# Patient Record
Sex: MALE | Race: OTHER
[De-identification: names, ages, dates, MRNs, and addresses within clinical notes are randomized per-mention and may not be internally consistent; named-entity substitution may affect disease eponyms.]

---

## 2019-10-01 ENCOUNTER — HOSPITAL ENCOUNTER (INPATIENT)
Dept: HOSPITAL 54 - ER | Age: 64
LOS: 6 days | Discharge: NURSING, REHAB OR LONG-TERM CARE | DRG: 197 | End: 2019-10-07
Attending: NURSE PRACTITIONER | Admitting: NURSE PRACTITIONER
Payer: COMMERCIAL

## 2019-10-01 VITALS — WEIGHT: 260 LBS | BODY MASS INDEX: 35.21 KG/M2 | HEIGHT: 72 IN

## 2019-10-01 VITALS — SYSTOLIC BLOOD PRESSURE: 101 MMHG | DIASTOLIC BLOOD PRESSURE: 56 MMHG

## 2019-10-01 VITALS — SYSTOLIC BLOOD PRESSURE: 144 MMHG | DIASTOLIC BLOOD PRESSURE: 84 MMHG

## 2019-10-01 VITALS — DIASTOLIC BLOOD PRESSURE: 84 MMHG | SYSTOLIC BLOOD PRESSURE: 144 MMHG

## 2019-10-01 DIAGNOSIS — Z79.84: ICD-10-CM

## 2019-10-01 DIAGNOSIS — Z79.899: ICD-10-CM

## 2019-10-01 DIAGNOSIS — Z86.718: ICD-10-CM

## 2019-10-01 DIAGNOSIS — Z79.01: ICD-10-CM

## 2019-10-01 DIAGNOSIS — E87.2: ICD-10-CM

## 2019-10-01 DIAGNOSIS — L03.116: ICD-10-CM

## 2019-10-01 DIAGNOSIS — Z79.4: ICD-10-CM

## 2019-10-01 DIAGNOSIS — I82.412: Primary | ICD-10-CM

## 2019-10-01 DIAGNOSIS — M17.10: ICD-10-CM

## 2019-10-01 DIAGNOSIS — I82.432: ICD-10-CM

## 2019-10-01 DIAGNOSIS — M16.0: ICD-10-CM

## 2019-10-01 DIAGNOSIS — E11.65: ICD-10-CM

## 2019-10-01 DIAGNOSIS — L02.416: ICD-10-CM

## 2019-10-01 DIAGNOSIS — D69.6: ICD-10-CM

## 2019-10-01 DIAGNOSIS — I10: ICD-10-CM

## 2019-10-01 DIAGNOSIS — E66.9: ICD-10-CM

## 2019-10-01 LAB
ALBUMIN SERPL BCP-MCNC: 3.7 G/DL (ref 3.4–5)
ALP SERPL-CCNC: 84 U/L (ref 46–116)
ALT SERPL W P-5'-P-CCNC: 26 U/L (ref 12–78)
AST SERPL W P-5'-P-CCNC: 8 U/L (ref 15–37)
BASOPHILS # BLD AUTO: 0.1 /CMM (ref 0–0.2)
BASOPHILS NFR BLD AUTO: 0.8 % (ref 0–2)
BILIRUB DIRECT SERPL-MCNC: 0.1 MG/DL (ref 0–0.2)
BILIRUB SERPL-MCNC: 0.7 MG/DL (ref 0.2–1)
BUN SERPL-MCNC: 12 MG/DL (ref 7–18)
CALCIUM SERPL-MCNC: 8.8 MG/DL (ref 8.5–10.1)
CHLORIDE SERPL-SCNC: 103 MMOL/L (ref 98–107)
CO2 SERPL-SCNC: 26 MMOL/L (ref 21–32)
CREAT SERPL-MCNC: 1.2 MG/DL (ref 0.6–1.3)
EOSINOPHIL NFR BLD AUTO: 3 % (ref 0–6)
GLUCOSE SERPL-MCNC: 199 MG/DL (ref 74–106)
HCT VFR BLD AUTO: 42 % (ref 39–51)
HGB BLD-MCNC: 14.2 G/DL (ref 13.5–17.5)
LYMPHOCYTES NFR BLD AUTO: 1.9 /CMM (ref 0.8–4.8)
LYMPHOCYTES NFR BLD AUTO: 28.4 % (ref 20–44)
MCHC RBC AUTO-ENTMCNC: 34 G/DL (ref 31–36)
MCV RBC AUTO: 91 FL (ref 80–96)
MONOCYTES NFR BLD AUTO: 0.5 /CMM (ref 0.1–1.3)
MONOCYTES NFR BLD AUTO: 7.7 % (ref 2–12)
NEUTROPHILS # BLD AUTO: 4 /CMM (ref 1.8–8.9)
NEUTROPHILS NFR BLD AUTO: 60.1 % (ref 43–81)
PLATELET # BLD AUTO: 149 /CMM (ref 150–450)
POTASSIUM SERPL-SCNC: 3.9 MMOL/L (ref 3.5–5.1)
PROT SERPL-MCNC: 8.1 G/DL (ref 6.4–8.2)
RBC # BLD AUTO: 4.58 MIL/UL (ref 4.5–6)
SODIUM SERPL-SCNC: 140 MMOL/L (ref 136–145)
WBC NRBC COR # BLD AUTO: 6.7 K/UL (ref 4.3–11)

## 2019-10-01 PROCEDURE — G0378 HOSPITAL OBSERVATION PER HR: HCPCS

## 2019-10-01 RX ADMIN — INSULIN GLARGINE SCH UNIT: 100 INJECTION, SOLUTION SUBCUTANEOUS at 22:23

## 2019-10-01 RX ADMIN — SODIUM CHLORIDE PRN MLS/HR: 9 INJECTION, SOLUTION INTRAVENOUS at 21:00

## 2019-10-01 RX ADMIN — PIPERACILLIN SODIUM AND TAZOBACTAM SODIUM SCH MLS/HR: .375; 3 INJECTION, POWDER, LYOPHILIZED, FOR SOLUTION INTRAVENOUS at 20:52

## 2019-10-01 RX ADMIN — Medication SCH EACH: at 22:17

## 2019-10-01 RX ADMIN — INSULIN HUMAN PRN UNIT: 100 INJECTION, SOLUTION PARENTERAL at 22:25

## 2019-10-01 RX ADMIN — Medication SCH MG: at 22:17

## 2019-10-01 NOTE — NUR
PT [facility] from [facility] "LLE swelling r/o 
abscess". Pt AAOX4, BREATHING EVENLY ON ROOM AIR, NAD. WILL CONTINUE TO 
MONITOR.

## 2019-10-01 NOTE — NUR
ADMITTED PT FROM ER WITH DX LLE CELLULITIS AND LLE DVT.PT IS ALERT AND ORIENTED X3.VERBALLY 
RESPONSIVE IN Persian AND LITTLE ENGLISH.AMBULATES WITH FWW WITH ASSIST.WITH NON PITTING 
EDEMA 2+ ON LLE. DENIES ANY PAIN OR DISTRESS AT THIS TIME.SKIN INTACT.NO SOB ON ROOM 
AIR.AWAITING FOR ADMITTING ORDERS.,DNP AWARE.CALL LIGHT PLACED WITHIN REACH.

## 2019-10-01 NOTE — NUR
MS/RN OPENING NOTES

RECEIVED PATIENT IN BED, ALERT, ORIENTED X3, ABLE TO VERBALIZE NEEDS, WITH LOWER EXTREMITY 
ON LEFT INFLAMED. WITH DVT , ON IV ANTIBIOTIC THERAPY ZOSYN TO BE ADMINISTERED AT 2100. 
PATIENT WAS RECEIVED BY AM RN IN AFTERNOON AND REPORTED AND VERBALIZED BELONGINGS , REFUSE 
TO HAVE CASH BE PUT IN SAFE FOR SAFETY, AND REFUSE BACK PACK BE INVENTORIED. PATIENT WITH 
LEFT AC IV PLACED AND REFUSE TO HAVE ANOTHER ONE BE INSERTED AT THIS TIME. WILL MONITOR, 
USES WALKER AND REQUIRE  BIGGER ONE FOR HIS SIZE AND HEIGHT. BED LOCKED, CALL LIGHTS WITHIN 
REACH. RECEIVED ENDORSEMENT FROM AM RN. PHOTO ON LEFT LEG DONE, WILL MONITOR.

## 2019-10-01 NOTE — NUR
MS/RN NOTES

BLOOD SUGAR CHECK  PATIENT ALERT, ORIENTED X3,, ABLE TO VERBALIZE NEEDS, ADMINISTERED 
SQ TO SITE OF SELECTION, PROVIDED SNACKS. WILL MONITOR.

## 2019-10-02 VITALS — DIASTOLIC BLOOD PRESSURE: 93 MMHG | SYSTOLIC BLOOD PRESSURE: 139 MMHG

## 2019-10-02 VITALS — SYSTOLIC BLOOD PRESSURE: 147 MMHG | DIASTOLIC BLOOD PRESSURE: 84 MMHG

## 2019-10-02 VITALS — SYSTOLIC BLOOD PRESSURE: 124 MMHG | DIASTOLIC BLOOD PRESSURE: 82 MMHG

## 2019-10-02 LAB
ALBUMIN SERPL BCP-MCNC: 3.1 G/DL (ref 3.4–5)
ALP SERPL-CCNC: 66 U/L (ref 46–116)
ALT SERPL W P-5'-P-CCNC: 25 U/L (ref 12–78)
AST SERPL W P-5'-P-CCNC: 11 U/L (ref 15–37)
BASOPHILS # BLD AUTO: 0 /CMM (ref 0–0.2)
BASOPHILS NFR BLD AUTO: 0.6 % (ref 0–2)
BILIRUB SERPL-MCNC: 0.7 MG/DL (ref 0.2–1)
BUN SERPL-MCNC: 14 MG/DL (ref 7–18)
CALCIUM SERPL-MCNC: 8.5 MG/DL (ref 8.5–10.1)
CHLORIDE SERPL-SCNC: 107 MMOL/L (ref 98–107)
CHOLEST SERPL-MCNC: 194 MG/DL (ref ?–200)
CO2 SERPL-SCNC: 25 MMOL/L (ref 21–32)
CREAT SERPL-MCNC: 1.1 MG/DL (ref 0.6–1.3)
EOSINOPHIL NFR BLD AUTO: 4.3 % (ref 0–6)
GLUCOSE SERPL-MCNC: 162 MG/DL (ref 74–106)
HCT VFR BLD AUTO: 36 % (ref 39–51)
HDLC SERPL-MCNC: 25 MG/DL (ref 40–60)
HGB BLD-MCNC: 12.8 G/DL (ref 13.5–17.5)
LDLC SERPL DIRECT ASSAY-MCNC: 78 MG/DL (ref 0–99)
LYMPHOCYTES NFR BLD AUTO: 1.9 /CMM (ref 0.8–4.8)
LYMPHOCYTES NFR BLD AUTO: 29.3 % (ref 20–44)
MAGNESIUM SERPL-MCNC: 2.2 MG/DL (ref 1.8–2.4)
MCHC RBC AUTO-ENTMCNC: 35 G/DL (ref 31–36)
MCV RBC AUTO: 90 FL (ref 80–96)
MONOCYTES NFR BLD AUTO: 0.6 /CMM (ref 0.1–1.3)
MONOCYTES NFR BLD AUTO: 10.1 % (ref 2–12)
NEUTROPHILS # BLD AUTO: 3.5 /CMM (ref 1.8–8.9)
NEUTROPHILS NFR BLD AUTO: 55.7 % (ref 43–81)
PHOSPHATE SERPL-MCNC: 4.4 MG/DL (ref 2.5–4.9)
PLATELET # BLD AUTO: 132 /CMM (ref 150–450)
POTASSIUM SERPL-SCNC: 3.8 MMOL/L (ref 3.5–5.1)
PROT SERPL-MCNC: 6.7 G/DL (ref 6.4–8.2)
RBC # BLD AUTO: 4.03 MIL/UL (ref 4.5–6)
SODIUM SERPL-SCNC: 141 MMOL/L (ref 136–145)
TRIGL SERPL-MCNC: 489 MG/DL (ref 30–150)
TSH SERPL DL<=0.005 MIU/L-ACNC: 2.15 UIU/ML (ref 0.36–3.74)
WBC NRBC COR # BLD AUTO: 6.4 K/UL (ref 4.3–11)

## 2019-10-02 RX ADMIN — PIPERACILLIN SODIUM AND TAZOBACTAM SODIUM SCH MLS/HR: .375; 3 INJECTION, POWDER, LYOPHILIZED, FOR SOLUTION INTRAVENOUS at 08:49

## 2019-10-02 RX ADMIN — INSULIN GLARGINE SCH UNIT: 100 INJECTION, SOLUTION SUBCUTANEOUS at 22:09

## 2019-10-02 RX ADMIN — Medication SCH EACH: at 12:12

## 2019-10-02 RX ADMIN — PIPERACILLIN SODIUM AND TAZOBACTAM SODIUM SCH MLS/HR: .375; 3 INJECTION, POWDER, LYOPHILIZED, FOR SOLUTION INTRAVENOUS at 03:52

## 2019-10-02 RX ADMIN — Medication SCH MG: at 22:07

## 2019-10-02 RX ADMIN — SODIUM CHLORIDE PRN MLS/HR: 9 INJECTION, SOLUTION INTRAVENOUS at 15:16

## 2019-10-02 RX ADMIN — Medication SCH EACH: at 18:16

## 2019-10-02 RX ADMIN — PIPERACILLIN SODIUM AND TAZOBACTAM SODIUM SCH MLS/HR: .375; 3 INJECTION, POWDER, LYOPHILIZED, FOR SOLUTION INTRAVENOUS at 21:18

## 2019-10-02 RX ADMIN — RIVAROXABAN SCH MG: 10 TABLET, FILM COATED ORAL at 12:28

## 2019-10-02 RX ADMIN — Medication SCH EACH: at 22:17

## 2019-10-02 RX ADMIN — DEXTROSE MONOHYDRATE SCH MLS/HR: 50 INJECTION, SOLUTION INTRAVENOUS at 01:39

## 2019-10-02 RX ADMIN — DEXTROSE MONOHYDRATE SCH MLS/HR: 50 INJECTION, SOLUTION INTRAVENOUS at 15:16

## 2019-10-02 RX ADMIN — INSULIN HUMAN PRN UNIT: 100 INJECTION, SOLUTION PARENTERAL at 12:24

## 2019-10-02 RX ADMIN — Medication PRN EACH: at 22:50

## 2019-10-02 RX ADMIN — INSULIN HUMAN PRN UNIT: 100 INJECTION, SOLUTION PARENTERAL at 18:19

## 2019-10-02 RX ADMIN — INSULIN HUMAN PRN UNIT: 100 INJECTION, SOLUTION PARENTERAL at 22:08

## 2019-10-02 RX ADMIN — Medication SCH EACH: at 06:02

## 2019-10-02 RX ADMIN — PIPERACILLIN SODIUM AND TAZOBACTAM SODIUM SCH MLS/HR: .375; 3 INJECTION, POWDER, LYOPHILIZED, FOR SOLUTION INTRAVENOUS at 15:42

## 2019-10-02 RX ADMIN — Medication SCH EACH: at 16:33

## 2019-10-02 NOTE — NUR
MS RN OPENING NOTES



RECEIVED PATIENT IN BED ASLEEP, AROUSABLE TO VERBAL AND TACTILE STIMULI. HOB ELEVATED. NO 
SOB .DENIES ANY C/O PAIN NOR DISCOMFORT AT THIS TIME. LAC # 18 INTACT AND PATENT INFUSING 75 
ML/HR STACY WELL. BED IN LOWEST POSITION, LOCKED. CALL LIGHT WITHIN REACH.

## 2019-10-02 NOTE — NUR
MS RN CLOSING  NOTES



ALERT AND ORIENTED X4. HOB ELEVATED. NO SOB . RESTING COMFORTABLY IN BED. DENIES ANY C/O 
PAIN NOR DISCOMFORT AT THIS TIME. LAC # 18 INTACT AND PATENT INFUSING NS @ 75 ML/HR STACY 
WELL. BED IN LOWEST POSITION, LOCKED. CALL LIGHT WITHIN REACH.  ABLE TO VERBALIZE NEEDS. IN 
NO APPARENT DISTRESS.

## 2019-10-02 NOTE — NUR
MS/RN NOTES

PATIENT RESTING IN BED, ASLEEP, RESPIRATIONS EVEN AND UNLABORED, AROUSES EASILY. SKIN WARM 
TO TOUCH, ABLE TO USE URINAL AND VERBALIZE NEEDS, REFUSE PAIN MEDICATION, LEFT LEG SWOLLEN . 
BED LOCKED, CALL LIGHTS WITHIN REACH. WILL MONITOR. BLOOD SUGAR CHECK AND PREFER TO HAVE 
INSULIN RIGHT BEFORE BREAKFAST REPORTED. WILL ENDORSE TO AM RN FOR PASTOR.

## 2019-10-02 NOTE — NUR
MS/RN

ON INITIAL ROUNDING AT 1930, PATIENT WAS IN BED AWAKE, ALERT, ORIENTED, COMFORTABLE, NO C/O 
PAIN, NO DISTRESS NOTED, CALL LIGHT IN REACH. FALL PRECAUTION, WILL MONITOR.

## 2019-10-03 VITALS — DIASTOLIC BLOOD PRESSURE: 96 MMHG | SYSTOLIC BLOOD PRESSURE: 132 MMHG

## 2019-10-03 VITALS — DIASTOLIC BLOOD PRESSURE: 84 MMHG | SYSTOLIC BLOOD PRESSURE: 135 MMHG

## 2019-10-03 VITALS — SYSTOLIC BLOOD PRESSURE: 131 MMHG | DIASTOLIC BLOOD PRESSURE: 79 MMHG

## 2019-10-03 LAB
BASOPHILS # BLD AUTO: 0 /CMM (ref 0–0.2)
BASOPHILS NFR BLD AUTO: 0.4 % (ref 0–2)
BUN SERPL-MCNC: 9 MG/DL (ref 7–18)
CALCIUM SERPL-MCNC: 8.5 MG/DL (ref 8.5–10.1)
CHLORIDE SERPL-SCNC: 104 MMOL/L (ref 98–107)
CO2 SERPL-SCNC: 28 MMOL/L (ref 21–32)
CREAT SERPL-MCNC: 1.1 MG/DL (ref 0.6–1.3)
EOSINOPHIL NFR BLD AUTO: 3.4 % (ref 0–6)
GLUCOSE SERPL-MCNC: 160 MG/DL (ref 74–106)
HCT VFR BLD AUTO: 38 % (ref 39–51)
HGB BLD-MCNC: 13 G/DL (ref 13.5–17.5)
LYMPHOCYTES NFR BLD AUTO: 2.1 /CMM (ref 0.8–4.8)
LYMPHOCYTES NFR BLD AUTO: 23.7 % (ref 20–44)
MCHC RBC AUTO-ENTMCNC: 34 G/DL (ref 31–36)
MCV RBC AUTO: 91 FL (ref 80–96)
MONOCYTES NFR BLD AUTO: 0.9 /CMM (ref 0.1–1.3)
MONOCYTES NFR BLD AUTO: 9.6 % (ref 2–12)
NEUTROPHILS # BLD AUTO: 5.7 /CMM (ref 1.8–8.9)
NEUTROPHILS NFR BLD AUTO: 62.9 % (ref 43–81)
PLATELET # BLD AUTO: 138 /CMM (ref 150–450)
POTASSIUM SERPL-SCNC: 3.5 MMOL/L (ref 3.5–5.1)
RBC # BLD AUTO: 4.17 MIL/UL (ref 4.5–6)
SODIUM SERPL-SCNC: 140 MMOL/L (ref 136–145)
WBC NRBC COR # BLD AUTO: 9.1 K/UL (ref 4.3–11)

## 2019-10-03 RX ADMIN — DEXTROSE MONOHYDRATE SCH MLS/HR: 50 INJECTION, SOLUTION INTRAVENOUS at 02:08

## 2019-10-03 RX ADMIN — INSULIN HUMAN PRN UNIT: 100 INJECTION, SOLUTION PARENTERAL at 06:40

## 2019-10-03 RX ADMIN — Medication SCH EACH: at 17:01

## 2019-10-03 RX ADMIN — INSULIN HUMAN PRN UNIT: 100 INJECTION, SOLUTION PARENTERAL at 12:25

## 2019-10-03 RX ADMIN — Medication PRN EACH: at 20:21

## 2019-10-03 RX ADMIN — Medication SCH EACH: at 17:17

## 2019-10-03 RX ADMIN — Medication SCH MG: at 21:22

## 2019-10-03 RX ADMIN — RIVAROXABAN SCH MG: 10 TABLET, FILM COATED ORAL at 12:18

## 2019-10-03 RX ADMIN — PIPERACILLIN SODIUM AND TAZOBACTAM SODIUM SCH MLS/HR: .375; 3 INJECTION, POWDER, LYOPHILIZED, FOR SOLUTION INTRAVENOUS at 20:17

## 2019-10-03 RX ADMIN — PIPERACILLIN SODIUM AND TAZOBACTAM SODIUM SCH MLS/HR: .375; 3 INJECTION, POWDER, LYOPHILIZED, FOR SOLUTION INTRAVENOUS at 08:53

## 2019-10-03 RX ADMIN — Medication SCH EACH: at 21:22

## 2019-10-03 RX ADMIN — Medication PRN EACH: at 14:21

## 2019-10-03 RX ADMIN — PIPERACILLIN SODIUM AND TAZOBACTAM SODIUM SCH MLS/HR: .375; 3 INJECTION, POWDER, LYOPHILIZED, FOR SOLUTION INTRAVENOUS at 04:21

## 2019-10-03 RX ADMIN — Medication SCH EACH: at 12:18

## 2019-10-03 RX ADMIN — SODIUM CHLORIDE PRN MLS/HR: 9 INJECTION, SOLUTION INTRAVENOUS at 12:00

## 2019-10-03 RX ADMIN — DEXTROSE MONOHYDRATE SCH MLS/HR: 50 INJECTION, SOLUTION INTRAVENOUS at 15:20

## 2019-10-03 RX ADMIN — PIPERACILLIN SODIUM AND TAZOBACTAM SODIUM SCH MLS/HR: .375; 3 INJECTION, POWDER, LYOPHILIZED, FOR SOLUTION INTRAVENOUS at 15:03

## 2019-10-03 RX ADMIN — Medication SCH EACH: at 06:49

## 2019-10-03 RX ADMIN — Medication SCH EACH: at 08:38

## 2019-10-03 RX ADMIN — INSULIN HUMAN PRN UNIT: 100 INJECTION, SOLUTION PARENTERAL at 17:01

## 2019-10-03 RX ADMIN — INSULIN GLARGINE SCH UNIT: 100 INJECTION, SOLUTION SUBCUTANEOUS at 21:23

## 2019-10-03 RX ADMIN — INSULIN HUMAN PRN UNIT: 100 INJECTION, SOLUTION PARENTERAL at 21:24

## 2019-10-03 NOTE — NUR
MS RN CLOSING NOTES



PATIENT RESTING COMNFORTABLY IN BED. NO SOB. DENIES ANY C/O PAIN NOR DISCOMFORT AT THIS 
TIME. RH # 22 SL INTACT AND PATENT, LT HAND # 12 INTACT AND PATENT INFUSING NS @ 75 ML/HR 
STACY WELL. AMBULATORY WITH UNSTEADY GAIT WITH FWW. IN NO APPARENT DISTRESS. BED IN LOWEST 
POSITION, LOCKED. BED SIDERAILS UP X2. CALL LIGHT WITHIN REACH.

## 2019-10-03 NOTE — NUR
MS/RN

PATIENT IS AWAKE AT THIS TIME, COMFORTABLE, NO DISTRESS NOTED, CALL LIGHT IN REACH. ALL 
NEEDS ATTENDED AT THIS TIME, WILL CONTINUE TO MONITOR.

## 2019-10-03 NOTE — NUR
MS/RN

PATIENT IS SLEEPING AT THIS TIME, APPEAR COMFORTABLE, NO SIGNS OF DISTRESS NOTED, CALL LIGHT 
IN REACH. WILL CONTINUE TO MONITOR.

## 2019-10-03 NOTE — NUR
MS RN 



RECEIVE PT IN BED AWAKE WATCHING TV  A/O X 3, RESPIRATION EVEN AND UNLABORED, NO S/S OF 
DISTRESS. SAFETY MEASURES IN PLACE. WILL CONT TO MTR

## 2019-10-03 NOTE — NUR
MS RN OPENING NOTES



RECEIVED PATIENT IN BED, ASLEEP BUT AROUSABLE TO VERBAL AND TACTILE STIMULI. HOB ELEVATED. 
NO SOB. DENIES ANY C/O PAIN NOR DISCOMFORT AT THIS TIME. RH # 22SL INTACT AND PATENT, LAC # 
18 INTACT AND PATENT INFUSING NS @ 75 ML/HR STACY WELL.  BED IN LOWEST POSITION, LOCKED. BED 
SIDERAILS UP X2. CALL LIGHT WITHIN REACH.

## 2019-10-03 NOTE — NUR
MS/RN

PATIENT AGREED FOR IV INSERTION, IV G22 WAS INSERTED BY TAIWO STEEL.

-------------------------------------------------------------------------------

Addendum: 10/03/19 at 0525 by AL BORJA RN

-------------------------------------------------------------------------------

PLS. DISREGARD ABOVE DOCUMENTATION, ENTERED IN ERROR.

## 2019-10-03 NOTE — NUR
WOUND CARE CONSULT: PT PRESENTS AS INDEPENDENT WITH BED MOBILITY AND CONTINENT WITH 
REDNESS/SWELLING TO LEFT LOWER LEG, PRESENT ON ADMISSION. DEFER TO MD. WILL SEE PRN. 

-------------------------------------------------------------------------------

Addendum: 10/03/19 at 1130 by KENIA SALOMON WNDNU

-------------------------------------------------------------------------------

Amended: Links added.

## 2019-10-04 VITALS — SYSTOLIC BLOOD PRESSURE: 124 MMHG | DIASTOLIC BLOOD PRESSURE: 75 MMHG

## 2019-10-04 VITALS — DIASTOLIC BLOOD PRESSURE: 93 MMHG | SYSTOLIC BLOOD PRESSURE: 121 MMHG

## 2019-10-04 VITALS — DIASTOLIC BLOOD PRESSURE: 85 MMHG | SYSTOLIC BLOOD PRESSURE: 109 MMHG

## 2019-10-04 VITALS — DIASTOLIC BLOOD PRESSURE: 93 MMHG | SYSTOLIC BLOOD PRESSURE: 133 MMHG

## 2019-10-04 LAB
BASOPHILS # BLD AUTO: 0 /CMM (ref 0–0.2)
BASOPHILS NFR BLD AUTO: 0.6 % (ref 0–2)
BUN SERPL-MCNC: 11 MG/DL (ref 7–18)
CALCIUM SERPL-MCNC: 8.3 MG/DL (ref 8.5–10.1)
CHLORIDE SERPL-SCNC: 104 MMOL/L (ref 98–107)
CO2 SERPL-SCNC: 27 MMOL/L (ref 21–32)
CREAT SERPL-MCNC: 1.1 MG/DL (ref 0.6–1.3)
EOSINOPHIL NFR BLD AUTO: 5.4 % (ref 0–6)
GLUCOSE SERPL-MCNC: 153 MG/DL (ref 74–106)
HCT VFR BLD AUTO: 37 % (ref 39–51)
HGB BLD-MCNC: 12.6 G/DL (ref 13.5–17.5)
LYMPHOCYTES NFR BLD AUTO: 2.2 /CMM (ref 0.8–4.8)
LYMPHOCYTES NFR BLD AUTO: 28.5 % (ref 20–44)
MCHC RBC AUTO-ENTMCNC: 34 G/DL (ref 31–36)
MCV RBC AUTO: 91 FL (ref 80–96)
MONOCYTES NFR BLD AUTO: 0.7 /CMM (ref 0.1–1.3)
MONOCYTES NFR BLD AUTO: 9.6 % (ref 2–12)
NEUTROPHILS # BLD AUTO: 4.2 /CMM (ref 1.8–8.9)
NEUTROPHILS NFR BLD AUTO: 55.9 % (ref 43–81)
PLATELET # BLD AUTO: 134 /CMM (ref 150–450)
POTASSIUM SERPL-SCNC: 3.7 MMOL/L (ref 3.5–5.1)
RBC # BLD AUTO: 4.08 MIL/UL (ref 4.5–6)
SODIUM SERPL-SCNC: 140 MMOL/L (ref 136–145)
WBC NRBC COR # BLD AUTO: 7.6 K/UL (ref 4.3–11)

## 2019-10-04 PROCEDURE — 0Y9J3ZZ DRAINAGE OF LEFT LOWER LEG, PERCUTANEOUS APPROACH: ICD-10-PCS

## 2019-10-04 RX ADMIN — Medication SCH EACH: at 17:16

## 2019-10-04 RX ADMIN — Medication SCH EACH: at 06:05

## 2019-10-04 RX ADMIN — Medication SCH MG: at 21:04

## 2019-10-04 RX ADMIN — Medication SCH EACH: at 08:14

## 2019-10-04 RX ADMIN — INSULIN GLARGINE SCH UNIT: 100 INJECTION, SOLUTION SUBCUTANEOUS at 21:07

## 2019-10-04 RX ADMIN — INSULIN HUMAN PRN UNIT: 100 INJECTION, SOLUTION PARENTERAL at 21:08

## 2019-10-04 RX ADMIN — Medication PRN EACH: at 12:41

## 2019-10-04 RX ADMIN — Medication PRN EACH: at 21:04

## 2019-10-04 RX ADMIN — PIPERACILLIN SODIUM AND TAZOBACTAM SODIUM SCH MLS/HR: .375; 3 INJECTION, POWDER, LYOPHILIZED, FOR SOLUTION INTRAVENOUS at 03:37

## 2019-10-04 RX ADMIN — SODIUM CHLORIDE PRN MLS/HR: 9 INJECTION, SOLUTION INTRAVENOUS at 08:46

## 2019-10-04 RX ADMIN — DEXTROSE MONOHYDRATE SCH MLS/HR: 50 INJECTION, SOLUTION INTRAVENOUS at 01:05

## 2019-10-04 RX ADMIN — RIVAROXABAN SCH MG: 10 TABLET, FILM COATED ORAL at 14:01

## 2019-10-04 RX ADMIN — SODIUM CHLORIDE PRN MLS/HR: 9 INJECTION, SOLUTION INTRAVENOUS at 23:00

## 2019-10-04 RX ADMIN — DEXTROSE MONOHYDRATE SCH MLS/HR: 50 INJECTION, SOLUTION INTRAVENOUS at 14:06

## 2019-10-04 RX ADMIN — PIPERACILLIN SODIUM AND TAZOBACTAM SODIUM SCH MLS/HR: .375; 3 INJECTION, POWDER, LYOPHILIZED, FOR SOLUTION INTRAVENOUS at 08:39

## 2019-10-04 RX ADMIN — Medication SCH EACH: at 12:00

## 2019-10-04 RX ADMIN — INSULIN HUMAN PRN UNIT: 100 INJECTION, SOLUTION PARENTERAL at 06:06

## 2019-10-04 RX ADMIN — INSULIN HUMAN PRN UNIT: 100 INJECTION, SOLUTION PARENTERAL at 17:19

## 2019-10-04 RX ADMIN — PIPERACILLIN SODIUM AND TAZOBACTAM SODIUM SCH MLS/HR: .375; 3 INJECTION, POWDER, LYOPHILIZED, FOR SOLUTION INTRAVENOUS at 21:03

## 2019-10-04 RX ADMIN — PIPERACILLIN SODIUM AND TAZOBACTAM SODIUM SCH MLS/HR: .375; 3 INJECTION, POWDER, LYOPHILIZED, FOR SOLUTION INTRAVENOUS at 15:55

## 2019-10-04 RX ADMIN — Medication SCH EACH: at 21:03

## 2019-10-04 NOTE — NUR
m/s lvn: wili velásquez (us tech) called and will clarify with radiologist re: anticoagulation med. informed 
her that pt received it yesterday at noon for dvt. will continue to monitor.

## 2019-10-04 NOTE — NUR
m/s lvn: notes



in bed with eyes close. no distress noted. left lower ext still swelling with 
redness/discoloration. will continue to monitor.

## 2019-10-04 NOTE — NUR
m/s lvn: notes



christen (us tech) called and informed me that they will do it soon and have the consent ready.

## 2019-10-04 NOTE — NUR
m/s lvn: notes



pt back to his room, noted with band-aid to left lower leg area. no bleeding noted. specimen 
handed to me and called dr. reyes with order of aerobic and anaerobic culture only. order 
read back and carried out and acknowledged.

## 2019-10-04 NOTE — NUR
m/s lvn: notes



Successful ultrasound guided left calf abscess aspiration were done this afternoon, still 
with no bleeding noted, band aid intact. will continue to monitor.

## 2019-10-04 NOTE — NUR
m/s lvn: initial assessment



received pt in bed awake, a/ox4; Bhutanese speaking and understands english, but pt refusing 
to answer back in english. per [place]utanese staff, pt speaks very good english. no c/o pain or 
any discomfort. lle still with redness and swelling. for perc abscess drainage today, 
awaiting for us tech to do procedure. instructed to call for assistance. will continue to 
monitor.

## 2019-10-04 NOTE — NUR
MS RN



ASLEEP AND EASILY AWAKEN, RESPIRATION EVEN AND UNLABORED. NO C/O OF PAIN AT THIS TIME. KEPT 
CLEAN AND DRY, COMFORTABLE. NEEDS ATTENDED AND ANTICIPATED. WILL ENDORSE TO THE NEXT SHIFT.

## 2019-10-04 NOTE — NUR
m/s lvn: notes



consent obtained from pt with Zimbabwean staff translating re: percutaneous abscess drainage. 
pt verbalized understanding. christen (us tech) to come soon, pt made aware.

## 2019-10-04 NOTE — NUR
m/s lvn: notes



resting comfortable in bed. needs attended. no distress noted. instructed to call for 
assistance. will monitor.

## 2019-10-04 NOTE — NUR
m/s lvn: notes



transporter here to bring pt down to radiology dept instead of doing it in the room. iv h/l. 
taken down via wheelchair at this time.

## 2019-10-04 NOTE — NUR
MS RN 



RECEIVE PT IN BED A/O X 3, RESPIRATION EVEN AND UNLABORED, STABLE, NO S/S OF DISTRESS. 
SAFETY MEASURES IN PLACE. WILL CONT TO MTR

## 2019-10-05 VITALS — DIASTOLIC BLOOD PRESSURE: 62 MMHG | SYSTOLIC BLOOD PRESSURE: 120 MMHG

## 2019-10-05 VITALS — SYSTOLIC BLOOD PRESSURE: 110 MMHG | DIASTOLIC BLOOD PRESSURE: 70 MMHG

## 2019-10-05 VITALS — DIASTOLIC BLOOD PRESSURE: 77 MMHG | SYSTOLIC BLOOD PRESSURE: 126 MMHG

## 2019-10-05 LAB
BASOPHILS # BLD AUTO: 0 /CMM (ref 0–0.2)
BASOPHILS NFR BLD AUTO: 0.7 % (ref 0–2)
BUN SERPL-MCNC: 12 MG/DL (ref 7–18)
CALCIUM SERPL-MCNC: 9.4 MG/DL (ref 8.5–10.1)
CHLORIDE SERPL-SCNC: 104 MMOL/L (ref 98–107)
CO2 SERPL-SCNC: 30 MMOL/L (ref 21–32)
CREAT SERPL-MCNC: 1.1 MG/DL (ref 0.6–1.3)
EOSINOPHIL NFR BLD AUTO: 5.6 % (ref 0–6)
GLUCOSE SERPL-MCNC: 155 MG/DL (ref 74–106)
HCT VFR BLD AUTO: 40 % (ref 39–51)
HGB BLD-MCNC: 13.4 G/DL (ref 13.5–17.5)
LYMPHOCYTES NFR BLD AUTO: 1.9 /CMM (ref 0.8–4.8)
LYMPHOCYTES NFR BLD AUTO: 33.3 % (ref 20–44)
MAGNESIUM SERPL-MCNC: 2.3 MG/DL (ref 1.8–2.4)
MCHC RBC AUTO-ENTMCNC: 34 G/DL (ref 31–36)
MCV RBC AUTO: 91 FL (ref 80–96)
MONOCYTES NFR BLD AUTO: 0.5 /CMM (ref 0.1–1.3)
MONOCYTES NFR BLD AUTO: 8.7 % (ref 2–12)
NEUTROPHILS # BLD AUTO: 3 /CMM (ref 1.8–8.9)
NEUTROPHILS NFR BLD AUTO: 51.7 % (ref 43–81)
PHOSPHATE SERPL-MCNC: 4 MG/DL (ref 2.5–4.9)
PLATELET # BLD AUTO: 155 /CMM (ref 150–450)
POTASSIUM SERPL-SCNC: 3.8 MMOL/L (ref 3.5–5.1)
RBC # BLD AUTO: 4.37 MIL/UL (ref 4.5–6)
SODIUM SERPL-SCNC: 138 MMOL/L (ref 136–145)
WBC NRBC COR # BLD AUTO: 5.8 K/UL (ref 4.3–11)

## 2019-10-05 RX ADMIN — Medication SCH EACH: at 21:48

## 2019-10-05 RX ADMIN — INSULIN GLARGINE SCH UNIT: 100 INJECTION, SOLUTION SUBCUTANEOUS at 21:52

## 2019-10-05 RX ADMIN — DEXTROSE MONOHYDRATE SCH MLS/HR: 50 INJECTION, SOLUTION INTRAVENOUS at 01:02

## 2019-10-05 RX ADMIN — Medication SCH EACH: at 08:20

## 2019-10-05 RX ADMIN — PIPERACILLIN SODIUM AND TAZOBACTAM SODIUM SCH MLS/HR: .375; 3 INJECTION, POWDER, LYOPHILIZED, FOR SOLUTION INTRAVENOUS at 02:00

## 2019-10-05 RX ADMIN — RIVAROXABAN SCH MG: 10 TABLET, FILM COATED ORAL at 12:57

## 2019-10-05 RX ADMIN — INSULIN HUMAN PRN UNIT: 100 INJECTION, SOLUTION PARENTERAL at 17:20

## 2019-10-05 RX ADMIN — Medication SCH EACH: at 05:58

## 2019-10-05 RX ADMIN — Medication SCH MG: at 21:47

## 2019-10-05 RX ADMIN — Medication SCH EACH: at 17:11

## 2019-10-05 RX ADMIN — PIPERACILLIN SODIUM AND TAZOBACTAM SODIUM SCH MLS/HR: .375; 3 INJECTION, POWDER, LYOPHILIZED, FOR SOLUTION INTRAVENOUS at 20:57

## 2019-10-05 RX ADMIN — INSULIN HUMAN PRN UNIT: 100 INJECTION, SOLUTION PARENTERAL at 05:59

## 2019-10-05 RX ADMIN — INSULIN HUMAN PRN UNIT: 100 INJECTION, SOLUTION PARENTERAL at 12:00

## 2019-10-05 RX ADMIN — PIPERACILLIN SODIUM AND TAZOBACTAM SODIUM SCH MLS/HR: .375; 3 INJECTION, POWDER, LYOPHILIZED, FOR SOLUTION INTRAVENOUS at 15:04

## 2019-10-05 RX ADMIN — SODIUM CHLORIDE PRN MLS/HR: 9 INJECTION, SOLUTION INTRAVENOUS at 14:08

## 2019-10-05 RX ADMIN — DEXTROSE MONOHYDRATE SCH MLS/HR: 50 INJECTION, SOLUTION INTRAVENOUS at 14:02

## 2019-10-05 RX ADMIN — Medication SCH EACH: at 16:34

## 2019-10-05 RX ADMIN — INSULIN HUMAN PRN UNIT: 100 INJECTION, SOLUTION PARENTERAL at 21:51

## 2019-10-05 RX ADMIN — PIPERACILLIN SODIUM AND TAZOBACTAM SODIUM SCH MLS/HR: .375; 3 INJECTION, POWDER, LYOPHILIZED, FOR SOLUTION INTRAVENOUS at 08:20

## 2019-10-05 RX ADMIN — Medication SCH EACH: at 11:58

## 2019-10-05 NOTE — NUR
closing

PATIENT RESTING COMFORTABLY IN BED. NO SOB. DENIES ANY C/O PAIN NOR DISCOMFORT AT THIS TIME. 
RH # 22 SL INTACT AND PATENT, LT HAND # 12 INTACT AND PATENT INFUSING NS @ 75 ML/HR STACY 
WELL. AMBULATORY WITH WALKER ASSIST. IN NO APPARENT DISTRESS. BED IN LOWEST POSITION, 
LOCKED. BED SIDE RAILS UP X2. CALL LIGHT WITHIN REACH. RN REPORT TO PM SHIFT FOR CONTINUITY 
OF CARE

## 2019-10-05 NOTE — NUR
RN MS NOTES

 RECEIVED PATIENT IN BED AWAKE ALERT AND ORIENTED X3, ABLE TO MAKE NEEDS KNOWN ,RESPIRATIONS 
EVEN AND UNLABORED WITH EQUAL RISE AND FALL OF CHEST, DENIES ANY PAIN AT THIS TIME, IV SITES 
INTACT AND PATENT, NO REDNESS, NO INFILTRATION PRESENT, ORIENTED TO STAFF AND CALL LIGHT AND 
KEPT WITHIN REACH, LOW BED AND LOCKED, SAFETY PRECAUTIONS RENDERED, URINAL WITHIN REACH, ALL 
NEEDS ATTENDED AT THIS TIME, WILL CONTINUE TO MONITOR AND ATTEND TO NEEDS.

## 2019-10-05 NOTE — NUR
INITIAL

received pt in bed awake, a/ox4; [place]izean speaking and understands English, but pt refusing 
to answer back in English. per [place]an staff, pt speaks very good English. no c/o pain or 
any discomfort. LLE still with redness and swelling. for perc abscess drainage today, 
awaiting for us tech to do procedure. instructed to call for assistance. will continue to 
monitor.

## 2019-10-05 NOTE — NUR
RN TRANSFER OF CARE



RECEIVED PATIENT FROM TAIWO CHANDLER AT 2040. PATIENT A/O X 3. PATIENT ABLE TO MAKE NEEDS 
KNOWN. NO SIGNS OF RESPIRATORY DISTRESS. PATIENT DENIES SHORTNESS OF BREATH. PATIENT DENIES 
ANY PAIN AT THIS TIME. IV SITES INTACT AND PATENT, NO REDNESS, NO INFILTRATION PRESENT. 
SAFETY PRECAUTIONS IMPLEMENTED; CALL LIGHT WITHIN REACH, BED LOWEST POSITION, BED LOCKED, 
SIDE RAILS UP X2. ALL NEEDS ATTENDED TO AT THIS TIME. WILL CONTINUE TO MONITOR PATIENT.

## 2019-10-05 NOTE — NUR
RN MS CLOSING NOTED

 REPORT GIVEN TO TAIWO LUNA FOR CONTINUITY OF CARE, PATIENT REMAINS STABLE AT THIS TIME, 
CALL LIGHT KEPT WITHIN REACH, ALL NEEDS WERE ATTENDED.VS WNL.

## 2019-10-05 NOTE — NUR
MS RN



PT SLEPT WELL. NO SIGNIFICANT CHANGES AT THIS TIME. ALL NEEDS ATTENDED AND ANTICIPATED.  NO 
C/O OF PAIN AT THIS TIME. KEPT CLEAN AND DRY, COMFORTABLE. WILL ENDORSE TO THE NEXT SHIF

## 2019-10-06 VITALS — DIASTOLIC BLOOD PRESSURE: 88 MMHG | SYSTOLIC BLOOD PRESSURE: 134 MMHG

## 2019-10-06 VITALS — SYSTOLIC BLOOD PRESSURE: 130 MMHG | DIASTOLIC BLOOD PRESSURE: 74 MMHG

## 2019-10-06 VITALS — DIASTOLIC BLOOD PRESSURE: 52 MMHG | SYSTOLIC BLOOD PRESSURE: 121 MMHG

## 2019-10-06 VITALS — SYSTOLIC BLOOD PRESSURE: 134 MMHG | DIASTOLIC BLOOD PRESSURE: 88 MMHG

## 2019-10-06 LAB
BASOPHILS # BLD AUTO: 0.1 /CMM (ref 0–0.2)
BASOPHILS NFR BLD AUTO: 0.9 % (ref 0–2)
BUN SERPL-MCNC: 14 MG/DL (ref 7–18)
CALCIUM SERPL-MCNC: 8.8 MG/DL (ref 8.5–10.1)
CHLORIDE SERPL-SCNC: 105 MMOL/L (ref 98–107)
CO2 SERPL-SCNC: 25 MMOL/L (ref 21–32)
CREAT SERPL-MCNC: 1 MG/DL (ref 0.6–1.3)
EOSINOPHIL NFR BLD AUTO: 5.5 % (ref 0–6)
GLUCOSE SERPL-MCNC: 144 MG/DL (ref 74–106)
HCT VFR BLD AUTO: 39 % (ref 39–51)
HGB BLD-MCNC: 13.3 G/DL (ref 13.5–17.5)
LYMPHOCYTES NFR BLD AUTO: 2.3 /CMM (ref 0.8–4.8)
LYMPHOCYTES NFR BLD AUTO: 38.5 % (ref 20–44)
MCHC RBC AUTO-ENTMCNC: 34 G/DL (ref 31–36)
MCV RBC AUTO: 91 FL (ref 80–96)
MONOCYTES NFR BLD AUTO: 0.6 /CMM (ref 0.1–1.3)
MONOCYTES NFR BLD AUTO: 9 % (ref 2–12)
NEUTROPHILS # BLD AUTO: 2.8 /CMM (ref 1.8–8.9)
NEUTROPHILS NFR BLD AUTO: 46.1 % (ref 43–81)
PLATELET # BLD AUTO: 173 /CMM (ref 150–450)
POTASSIUM SERPL-SCNC: 3.8 MMOL/L (ref 3.5–5.1)
RBC # BLD AUTO: 4.34 MIL/UL (ref 4.5–6)
SODIUM SERPL-SCNC: 140 MMOL/L (ref 136–145)
WBC NRBC COR # BLD AUTO: 6.1 K/UL (ref 4.3–11)

## 2019-10-06 RX ADMIN — Medication SCH EACH: at 08:51

## 2019-10-06 RX ADMIN — RIVAROXABAN SCH MG: 10 TABLET, FILM COATED ORAL at 12:24

## 2019-10-06 RX ADMIN — PIPERACILLIN SODIUM AND TAZOBACTAM SODIUM SCH MLS/HR: .375; 3 INJECTION, POWDER, LYOPHILIZED, FOR SOLUTION INTRAVENOUS at 15:13

## 2019-10-06 RX ADMIN — Medication SCH EACH: at 16:38

## 2019-10-06 RX ADMIN — INSULIN GLARGINE SCH UNIT: 100 INJECTION, SOLUTION SUBCUTANEOUS at 22:43

## 2019-10-06 RX ADMIN — SODIUM CHLORIDE PRN MLS/HR: 9 INJECTION, SOLUTION INTRAVENOUS at 15:57

## 2019-10-06 RX ADMIN — INSULIN HUMAN PRN UNIT: 100 INJECTION, SOLUTION PARENTERAL at 22:45

## 2019-10-06 RX ADMIN — PIPERACILLIN SODIUM AND TAZOBACTAM SODIUM SCH MLS/HR: .375; 3 INJECTION, POWDER, LYOPHILIZED, FOR SOLUTION INTRAVENOUS at 21:15

## 2019-10-06 RX ADMIN — PIPERACILLIN SODIUM AND TAZOBACTAM SODIUM SCH MLS/HR: .375; 3 INJECTION, POWDER, LYOPHILIZED, FOR SOLUTION INTRAVENOUS at 02:57

## 2019-10-06 RX ADMIN — Medication SCH EACH: at 22:23

## 2019-10-06 RX ADMIN — Medication SCH MG: at 21:15

## 2019-10-06 RX ADMIN — Medication SCH EACH: at 12:17

## 2019-10-06 RX ADMIN — PIPERACILLIN SODIUM AND TAZOBACTAM SODIUM SCH MLS/HR: .375; 3 INJECTION, POWDER, LYOPHILIZED, FOR SOLUTION INTRAVENOUS at 08:52

## 2019-10-06 RX ADMIN — INSULIN HUMAN PRN UNIT: 100 INJECTION, SOLUTION PARENTERAL at 12:21

## 2019-10-06 RX ADMIN — DEXTROSE MONOHYDRATE SCH MLS/HR: 50 INJECTION, SOLUTION INTRAVENOUS at 08:52

## 2019-10-06 RX ADMIN — Medication SCH EACH: at 07:13

## 2019-10-06 RX ADMIN — INSULIN HUMAN PRN UNIT: 100 INJECTION, SOLUTION PARENTERAL at 06:46

## 2019-10-06 NOTE — NUR
MS RN CLOSING NOTE



PATIENT IN BED RESTING COMFORTABLY. PATIENT IN NO ACUTE DISTRESS. NO SOB NOTED. PATIENT 
BREATHING IS EVEN AND UNLABORED. NO FACIAL GRIMACING NOTED. PATIENT DENIES PAIN AT THIS 
TIME. SAFETY PRECAUTIONS IN PLACE. PATIENT KEPT CLEAN, DRY AND COMFORTABLE THROUGHOUT SHIFT. 
PATIENT BED IS LOCKED AND IN LOWEST POSITION. CALL LIGHT WITHIN REACH. WILL ENDORSE CARE TO 
PM SHIFT FOR PASTOR.

## 2019-10-06 NOTE — NUR
MS RN OPENING NOTE



RECEIVED PATIENT IN BED SLEEPING COMFORTABLY. PATIENT IN NO ACUTE DISTRESS. NO SOB NOTED. 
PATIENT BREATHING IS EVEN AND UNLABORED. NO FACIAL GRIMACING NOTED. SAFETY PRECAUTIONS IN 
PLACE. PATIENT BED IS LOCKED AND IN LOWEST POSITION. CALL LIGHT WITHIN REACH. WILL CONTINUE 
TO MONITOR.

## 2019-10-06 NOTE — NUR
RN CLOSING NOTES



PATIENT CURRENTLY ASLEEP, RESTING COMFORTABLY, EASILY AROUSABLE TO VOICE. PATIENT SLEPT 
WELL. NO ACUTE CHANGES AT THIS TIME. NO SIGNS OF FACIAL GRIMACING INDICATING PAIN OR 
DISCOMFORT.  PATIENT KEPT CLEAN, DRY AND COMFORTABLE. SAFETY PRECAUTIONS IMPLEMENTED; CALL 
LIGHT WITHIN REACH, BED LOWEST POSITION, BED LOCKED, SIDE RAILS UP X2. ALL NEEDS ATTENDED TO 
AT THIS TIME. WILL ENDORSE TO DAYSHIFT NURSE FOR CONTINUITY OF CARE.

## 2019-10-06 NOTE — NUR
MS RN NOTES



RECEIVED PATIENT AWAKE  IN BED RESTING COMFORTABLY. PATIENT IN NO ACUTE DISTRESS. NO SOB 
NOTED. PATIENT BREATHING IS EVEN AND UNLABORED. NO FACIAL GRIMACING NOTED. PATIENT DENIES 
PAIN AT THIS TIME. SAFETY PRECAUTIONS IN PLACE. PATIENT KEPT CLEAN, DRY AND COMFORTABLE 
THROUGHOUT SHIFT. PATIENT BED IS LOCKED AND IN LOWEST POSITION. CALL LIGHT WITHIN EAY REACH. 
WILL CONTINUE TO MONITOR ACCORDINGLY.

## 2019-10-07 VITALS — DIASTOLIC BLOOD PRESSURE: 71 MMHG | SYSTOLIC BLOOD PRESSURE: 122 MMHG

## 2019-10-07 VITALS — DIASTOLIC BLOOD PRESSURE: 74 MMHG | SYSTOLIC BLOOD PRESSURE: 113 MMHG

## 2019-10-07 LAB
BASOPHILS # BLD AUTO: 0.1 /CMM (ref 0–0.2)
BASOPHILS NFR BLD AUTO: 1.1 % (ref 0–2)
BUN SERPL-MCNC: 13 MG/DL (ref 7–18)
CALCIUM SERPL-MCNC: 8.7 MG/DL (ref 8.5–10.1)
CHLORIDE SERPL-SCNC: 103 MMOL/L (ref 98–107)
CO2 SERPL-SCNC: 27 MMOL/L (ref 21–32)
CREAT SERPL-MCNC: 1.1 MG/DL (ref 0.6–1.3)
EOSINOPHIL NFR BLD AUTO: 5.8 % (ref 0–6)
GLUCOSE SERPL-MCNC: 198 MG/DL (ref 74–106)
HCT VFR BLD AUTO: 42 % (ref 39–51)
HGB BLD-MCNC: 14 G/DL (ref 13.5–17.5)
LYMPHOCYTES NFR BLD AUTO: 2.1 /CMM (ref 0.8–4.8)
LYMPHOCYTES NFR BLD AUTO: 36.1 % (ref 20–44)
MCHC RBC AUTO-ENTMCNC: 34 G/DL (ref 31–36)
MCV RBC AUTO: 91 FL (ref 80–96)
MONOCYTES NFR BLD AUTO: 0.5 /CMM (ref 0.1–1.3)
MONOCYTES NFR BLD AUTO: 8.1 % (ref 2–12)
NEUTROPHILS # BLD AUTO: 2.8 /CMM (ref 1.8–8.9)
NEUTROPHILS NFR BLD AUTO: 48.9 % (ref 43–81)
PLATELET # BLD AUTO: 179 /CMM (ref 150–450)
POTASSIUM SERPL-SCNC: 4 MMOL/L (ref 3.5–5.1)
RBC # BLD AUTO: 4.58 MIL/UL (ref 4.5–6)
SODIUM SERPL-SCNC: 137 MMOL/L (ref 136–145)
WBC NRBC COR # BLD AUTO: 5.8 K/UL (ref 4.3–11)

## 2019-10-07 RX ADMIN — Medication SCH EACH: at 11:05

## 2019-10-07 RX ADMIN — PIPERACILLIN SODIUM AND TAZOBACTAM SODIUM SCH MLS/HR: .375; 3 INJECTION, POWDER, LYOPHILIZED, FOR SOLUTION INTRAVENOUS at 15:49

## 2019-10-07 RX ADMIN — SODIUM CHLORIDE PRN MLS/HR: 9 INJECTION, SOLUTION INTRAVENOUS at 13:35

## 2019-10-07 RX ADMIN — INSULIN HUMAN PRN UNIT: 100 INJECTION, SOLUTION PARENTERAL at 07:08

## 2019-10-07 RX ADMIN — PIPERACILLIN SODIUM AND TAZOBACTAM SODIUM SCH MLS/HR: .375; 3 INJECTION, POWDER, LYOPHILIZED, FOR SOLUTION INTRAVENOUS at 03:34

## 2019-10-07 RX ADMIN — RIVAROXABAN SCH MG: 10 TABLET, FILM COATED ORAL at 12:09

## 2019-10-07 RX ADMIN — Medication SCH EACH: at 18:23

## 2019-10-07 RX ADMIN — PIPERACILLIN SODIUM AND TAZOBACTAM SODIUM SCH MLS/HR: .375; 3 INJECTION, POWDER, LYOPHILIZED, FOR SOLUTION INTRAVENOUS at 08:33

## 2019-10-07 RX ADMIN — INSULIN HUMAN PRN UNIT: 100 INJECTION, SOLUTION PARENTERAL at 12:10

## 2019-10-07 RX ADMIN — Medication SCH EACH: at 16:08

## 2019-10-07 RX ADMIN — Medication SCH EACH: at 08:33

## 2019-10-07 RX ADMIN — DEXTROSE MONOHYDRATE SCH MLS/HR: 50 INJECTION, SOLUTION INTRAVENOUS at 01:56

## 2019-10-07 RX ADMIN — Medication SCH EACH: at 07:32

## 2019-10-07 NOTE — NUR
RN DISCHARGE NOTES

PT STABLE AT DISCHARGE. ALL DISCHARGE PAPERWORK, DISCUSSED, SIGNED, COPIED, AND GIVEN TO THE 
PATIENT. ID AND IV REMOVED. REPORT GIVEN TO EUNICE AT [facility]. PATIENT PICKED UP BY 
2 EMTS AT 1830. PT LEFT UNIT VIA GURNEY. ALL BELONGINGS TAKEN WITH PATIENT.

## 2019-10-07 NOTE — NUR
RN NOTES



ALL NEEDS ATTENDED AND MET, ABLE TO REST AND SLEPT AT INTERVALS, SAFETY MEASURES EMPHASIZED, 
CALL LIGHT WITH IN EASY REACH. ENDORSED FOR CONTINUITY OF CARE.